# Patient Record
Sex: MALE | Race: WHITE | Employment: UNEMPLOYED | URBAN - NONMETROPOLITAN AREA
[De-identification: names, ages, dates, MRNs, and addresses within clinical notes are randomized per-mention and may not be internally consistent; named-entity substitution may affect disease eponyms.]

---

## 2018-10-30 ENCOUNTER — HOSPITAL ENCOUNTER (OUTPATIENT)
Age: 58
Discharge: HOME | End: 2018-10-30
Payer: MEDICAID

## 2018-10-30 ENCOUNTER — OFFICE VISIT (OUTPATIENT)
Dept: INTERNAL MEDICINE CLINIC | Age: 58
End: 2018-10-30
Payer: MEDICAID

## 2018-10-30 VITALS
WEIGHT: 312.4 LBS | HEART RATE: 84 BPM | SYSTOLIC BLOOD PRESSURE: 110 MMHG | OXYGEN SATURATION: 98 % | BODY MASS INDEX: 42.31 KG/M2 | DIASTOLIC BLOOD PRESSURE: 84 MMHG | HEIGHT: 72 IN

## 2018-10-30 DIAGNOSIS — Z86.018 S/P SELECTIVE TRANSSPHENOIDAL PITUITARY ADENOMECTOMY: ICD-10-CM

## 2018-10-30 DIAGNOSIS — D35.2 PROLACTINOMA (HCC): ICD-10-CM

## 2018-10-30 DIAGNOSIS — E66.01 OBESITY, MORBID, BMI 40.0-49.9 (HCC): ICD-10-CM

## 2018-10-30 DIAGNOSIS — E23.0 PANHYPOPITUITARISM (DIABETES INSIPIDUS/ANTERIOR PITUITARY DEFICIENCY) (HCC): ICD-10-CM

## 2018-10-30 DIAGNOSIS — Z98.890 S/P SELECTIVE TRANSSPHENOIDAL PITUITARY ADENOMECTOMY: ICD-10-CM

## 2018-10-30 DIAGNOSIS — E29.1 HYPOGONADISM MALE: ICD-10-CM

## 2018-10-30 DIAGNOSIS — E03.9 HYPOTHYROIDISM, UNSPECIFIED TYPE: ICD-10-CM

## 2018-10-30 DIAGNOSIS — E22.1 HYPERPROLACTINEMIA (HCC): Primary | ICD-10-CM

## 2018-10-30 LAB
CORTISOL: 6.12 UG/DL
FOLLICLE STIMULATING HORMONE: 0.4 MIU/ML (ref 0.9–11)
LUTEINIZING HORMONE: 0.1 MIU/ML (ref 0.6–6.3)
TSH SERPL DL<=0.05 MIU/L-ACNC: 3.33 UIU/ML (ref 0.4–4.2)

## 2018-10-30 PROCEDURE — 36415 COLL VENOUS BLD VENIPUNCTURE: CPT

## 2018-10-30 PROCEDURE — 1036F TOBACCO NON-USER: CPT | Performed by: INTERNAL MEDICINE

## 2018-10-30 PROCEDURE — 99204 OFFICE O/P NEW MOD 45 MIN: CPT | Performed by: INTERNAL MEDICINE

## 2018-10-30 PROCEDURE — G8417 CALC BMI ABV UP PARAM F/U: HCPCS | Performed by: INTERNAL MEDICINE

## 2018-10-30 PROCEDURE — 82533 TOTAL CORTISOL: CPT

## 2018-10-30 PROCEDURE — 3017F COLORECTAL CA SCREEN DOC REV: CPT | Performed by: INTERNAL MEDICINE

## 2018-10-30 PROCEDURE — 83001 ASSAY OF GONADOTROPIN (FSH): CPT

## 2018-10-30 PROCEDURE — G8427 DOCREV CUR MEDS BY ELIG CLIN: HCPCS | Performed by: INTERNAL MEDICINE

## 2018-10-30 PROCEDURE — 83002 ASSAY OF GONADOTROPIN (LH): CPT

## 2018-10-30 PROCEDURE — 84443 ASSAY THYROID STIM HORMONE: CPT

## 2018-10-30 PROCEDURE — G8484 FLU IMMUNIZE NO ADMIN: HCPCS | Performed by: INTERNAL MEDICINE

## 2018-10-30 PROCEDURE — 82024 ASSAY OF ACTH: CPT

## 2018-10-30 ASSESSMENT — PATIENT HEALTH QUESTIONNAIRE - PHQ9
SUM OF ALL RESPONSES TO PHQ QUESTIONS 1-9: 2
SUM OF ALL RESPONSES TO PHQ QUESTIONS 1-9: 2
1. LITTLE INTEREST OR PLEASURE IN DOING THINGS: 1
SUM OF ALL RESPONSES TO PHQ9 QUESTIONS 1 & 2: 2
2. FEELING DOWN, DEPRESSED OR HOPELESS: 1

## 2018-10-30 NOTE — PROGRESS NOTES
Sella Tursica     Standing Status:   Future     Standing Expiration Date:   10/30/2019     Order Specific Question:   Reason for exam:     Answer:   prolactinoma    MRI BRAIN W WO CONTRAST     ATTN Sella Tursica     Standing Status:   Future     Standing Expiration Date:   10/30/2019     Order Specific Question:   Reason for exam:     Answer:   prolactinoma    TSH     Standing Status:   Future     Number of Occurrences:   1     Standing Expiration Date:   10/30/2019    Cortisol     Standing Status:   Future     Number of Occurrences:   1     Standing Expiration Date:   10/30/2019     Order Specific Question:   8AM or 4PM?     Answer:   8am    ACTH     Standing Status:   Future     Number of Occurrences:   1     Standing Expiration Date:   38/33/5715    Follicle Stimulating Hormone     Standing Status:   Future     Number of Occurrences:   1     Standing Expiration Date:   10/30/2019    Luteinizing Hormone     Standing Status:   Future     Number of Occurrences:   1     Standing Expiration Date:   10/30/2019     He denies any double vision, headaches. He has not had an MRI of the brain in quite some time. Recent blood work IGF-I is 26, testosterone total less than 7. 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 I  think he needs testosterone replacement   cannot really rely on the TSH post pituitary surgery. I think he will also need thyroid replacement. I will see him back after MRI of the brain.   We'll also check ACTH, cortisol, FSH, LH

## 2018-10-31 LAB — ACTH: 29 PG/ML (ref 7–69)

## 2018-11-12 ENCOUNTER — HOSPITAL ENCOUNTER (OUTPATIENT)
Dept: MRI IMAGING | Age: 58
Discharge: HOME | End: 2018-11-12
Payer: MEDICAID

## 2018-11-12 DIAGNOSIS — D35.2 PROLACTINOMA (HCC): ICD-10-CM

## 2018-11-12 LAB — POC CREATININE WHOLE BLOOD: 1.3 MG/DL (ref 0.5–1.2)

## 2018-11-12 PROCEDURE — A9579 GAD-BASE MR CONTRAST NOS,1ML: HCPCS | Performed by: INTERNAL MEDICINE

## 2018-11-12 PROCEDURE — 70553 MRI BRAIN STEM W/O & W/DYE: CPT

## 2018-11-12 PROCEDURE — 6360000004 HC RX CONTRAST MEDICATION: Performed by: INTERNAL MEDICINE

## 2018-11-12 PROCEDURE — 82565 ASSAY OF CREATININE: CPT

## 2018-11-12 RX ADMIN — GADOTERIDOL 20 ML: 279.3 INJECTION, SOLUTION INTRAVENOUS at 08:57

## 2018-12-06 ENCOUNTER — TELEPHONE (OUTPATIENT)
Dept: INTERNAL MEDICINE CLINIC | Age: 58
End: 2018-12-06

## 2018-12-06 ENCOUNTER — OFFICE VISIT (OUTPATIENT)
Dept: INTERNAL MEDICINE CLINIC | Age: 58
End: 2018-12-06
Payer: MEDICAID

## 2018-12-06 VITALS
DIASTOLIC BLOOD PRESSURE: 84 MMHG | SYSTOLIC BLOOD PRESSURE: 136 MMHG | HEIGHT: 71 IN | HEART RATE: 84 BPM | WEIGHT: 315 LBS | BODY MASS INDEX: 44.1 KG/M2

## 2018-12-06 DIAGNOSIS — E66.01 OBESITY, MORBID, BMI 40.0-49.9 (HCC): ICD-10-CM

## 2018-12-06 DIAGNOSIS — E23.0 PANHYPOPITUITARISM (HCC): Primary | ICD-10-CM

## 2018-12-06 DIAGNOSIS — R07.9 CHEST PAIN, UNSPECIFIED TYPE: ICD-10-CM

## 2018-12-06 DIAGNOSIS — R79.89 LOW TESTOSTERONE: ICD-10-CM

## 2018-12-06 DIAGNOSIS — Z98.890 HISTORY OF PITUITARY SURGERY: ICD-10-CM

## 2018-12-06 PROCEDURE — G8427 DOCREV CUR MEDS BY ELIG CLIN: HCPCS | Performed by: INTERNAL MEDICINE

## 2018-12-06 PROCEDURE — 1036F TOBACCO NON-USER: CPT | Performed by: INTERNAL MEDICINE

## 2018-12-06 PROCEDURE — G8417 CALC BMI ABV UP PARAM F/U: HCPCS | Performed by: INTERNAL MEDICINE

## 2018-12-06 PROCEDURE — 99213 OFFICE O/P EST LOW 20 MIN: CPT | Performed by: INTERNAL MEDICINE

## 2018-12-06 PROCEDURE — 93000 ELECTROCARDIOGRAM COMPLETE: CPT | Performed by: INTERNAL MEDICINE

## 2018-12-06 PROCEDURE — 3017F COLORECTAL CA SCREEN DOC REV: CPT | Performed by: INTERNAL MEDICINE

## 2018-12-06 PROCEDURE — G8484 FLU IMMUNIZE NO ADMIN: HCPCS | Performed by: INTERNAL MEDICINE

## 2018-12-06 RX ORDER — TESTOSTERONE 12.5 MG/1.25G
1 GEL TOPICAL DAILY
Qty: 1 BOTTLE | Refills: 3 | Status: SHIPPED | OUTPATIENT
Start: 2018-12-06 | End: 2019-04-15

## 2019-03-05 ENCOUNTER — TELEPHONE (OUTPATIENT)
Dept: INTERNAL MEDICINE CLINIC | Age: 59
End: 2019-03-05

## 2019-04-15 ENCOUNTER — OFFICE VISIT (OUTPATIENT)
Dept: INTERNAL MEDICINE CLINIC | Age: 59
End: 2019-04-15
Payer: MEDICAID

## 2019-04-15 ENCOUNTER — NURSE ONLY (OUTPATIENT)
Dept: LAB | Age: 59
End: 2019-04-15

## 2019-04-15 VITALS
WEIGHT: 315 LBS | HEART RATE: 80 BPM | SYSTOLIC BLOOD PRESSURE: 108 MMHG | OXYGEN SATURATION: 98 % | HEIGHT: 71 IN | DIASTOLIC BLOOD PRESSURE: 70 MMHG | BODY MASS INDEX: 44.1 KG/M2

## 2019-04-15 DIAGNOSIS — Z98.890 HISTORY OF PITUITARY SURGERY: ICD-10-CM

## 2019-04-15 DIAGNOSIS — E29.1 HYPOGONADISM MALE: ICD-10-CM

## 2019-04-15 DIAGNOSIS — E66.01 OBESITY, MORBID, BMI 40.0-49.9 (HCC): ICD-10-CM

## 2019-04-15 DIAGNOSIS — Z86.018 S/P SELECTIVE TRANSSPHENOIDAL PITUITARY ADENOMECTOMY: ICD-10-CM

## 2019-04-15 DIAGNOSIS — D35.2 PROLACTINOMA (HCC): ICD-10-CM

## 2019-04-15 DIAGNOSIS — E23.0 PANHYPOPITUITARISM (HCC): ICD-10-CM

## 2019-04-15 DIAGNOSIS — Z98.890 S/P SELECTIVE TRANSSPHENOIDAL PITUITARY ADENOMECTOMY: ICD-10-CM

## 2019-04-15 DIAGNOSIS — E23.0 PANHYPOPITUITARISM (HCC): Primary | ICD-10-CM

## 2019-04-15 LAB
ANION GAP SERPL CALCULATED.3IONS-SCNC: 11 MEQ/L (ref 8–16)
BUN BLDV-MCNC: 17 MG/DL (ref 7–22)
CALCIUM SERPL-MCNC: 9 MG/DL (ref 8.5–10.5)
CHLORIDE BLD-SCNC: 101 MEQ/L (ref 98–111)
CO2: 26 MEQ/L (ref 23–33)
CORTISOL COLLECTION INFO: NORMAL
CORTISOL: 3.32 UG/DL
CREAT SERPL-MCNC: 1 MG/DL (ref 0.4–1.2)
FOLLICLE STIMULATING HORMONE: 0.4 MIU/ML (ref 0.9–11)
GFR SERPL CREATININE-BSD FRML MDRD: 76 ML/MIN/1.73M2
GLUCOSE BLD-MCNC: 85 MG/DL (ref 70–108)
LUTEINIZING HORMONE: 0.3 MIU/ML (ref 0.6–6.3)
OSMOLALITY URINE: 761 MOSMOL/KG (ref 250–750)
OSMOLALITY: 293 MOSMOL/KG (ref 275–295)
POTASSIUM SERPL-SCNC: 4 MEQ/L (ref 3.5–5.2)
PROLACTIN: 76.3 NG/ML
SODIUM BLD-SCNC: 138 MEQ/L (ref 135–145)
SODIUM URINE: 86 MEQ/L
T4 FREE: 0.42 NG/DL (ref 0.93–1.76)
TSH SERPL DL<=0.05 MIU/L-ACNC: 3.82 UIU/ML (ref 0.4–4.2)

## 2019-04-15 PROCEDURE — G8427 DOCREV CUR MEDS BY ELIG CLIN: HCPCS | Performed by: INTERNAL MEDICINE

## 2019-04-15 PROCEDURE — 99213 OFFICE O/P EST LOW 20 MIN: CPT | Performed by: INTERNAL MEDICINE

## 2019-04-15 PROCEDURE — G8417 CALC BMI ABV UP PARAM F/U: HCPCS | Performed by: INTERNAL MEDICINE

## 2019-04-15 PROCEDURE — 3017F COLORECTAL CA SCREEN DOC REV: CPT | Performed by: INTERNAL MEDICINE

## 2019-04-15 PROCEDURE — 1036F TOBACCO NON-USER: CPT | Performed by: INTERNAL MEDICINE

## 2019-04-15 ASSESSMENT — PATIENT HEALTH QUESTIONNAIRE - PHQ9
SUM OF ALL RESPONSES TO PHQ9 QUESTIONS 1 & 2: 1
SUM OF ALL RESPONSES TO PHQ QUESTIONS 1-9: 1
SUM OF ALL RESPONSES TO PHQ QUESTIONS 1-9: 1
2. FEELING DOWN, DEPRESSED OR HOPELESS: 0
1. LITTLE INTEREST OR PLEASURE IN DOING THINGS: 1

## 2019-04-15 NOTE — PROGRESS NOTES
[facility]  PHYSICIANS [place] [address] [address], [address]ales [facility]  Dept: [phone]  Dept Fax: [phone]      Chief Complaint   Patient presents with    Other     Low Testosterone       Patient presents for evaluation of hyperprolactinemia. I saw him 4 months ago  This patient is followed regularly by Joseph Estrada, CNP    He has a history of 2 pituitary surgeries. The first was in 1978 with a skull flap done. Recurrent surgery in 1987, via transsphenoidal approach. The patient had recurrent headaches, back in 87. He had both surgeries at [facility]. He was referred here now for elevated prolactin levels   He says he is not on any thyroid hormone or cortisone  He was on bromocriptine since he was first diagnosed. At that time he had galactorrhea as well   He no longer takes it. I ordered labs and a MRI of the brain  MRI of the brain showed postop changes but no mass. He says he has new to new problems. He says he will follow sleep work despite using a CPAP. He says he wakes up feeling refreshed, but then falls asleep at work. He's also been having urinary incontinence over the past 10 days. He willt have to go and  not be able to hold it  Past Medical History:   Diagnosis Date    Elevated prolactin level (HCC)     [facility]uitary aden[facility] ([facility]     Sleep apnea        Current Outpatient Medications   Medication Sig Dispense Refill    sertraline (ZOLOFT) 50 MG tablet Take 50 mg by mouth daily       No current facility-administered medications for this visit. Review of Systems - as above. Following asleep at work. Urinary incontinence. No headache    Blood pressure 108/70, pulse 80, height 5' 11.5\" (1.816 m), weight (!) 340 lb (154.2 kg), SpO2 98 %. Physical Examination: Alert and oriented. Neck is supple, no JVD or bruits. Mental status is normal.  Hearing is normal     Diagnosis Orders   1.  Panhypopituitarism ([facility]  Basic Metabolic Panel    Sodium, Urine, Random    Osmolality, Urine    Osmolality, Serum    TSH    T4, Free    Cortisol    ACTH    Testosterone, free, total    Follicle Stimulating Hormone    Luteinizing Hormone    Growth Hormone    Insulin-Like Growth Factor   2. Obesity, morbid, BMI 40.0-49.9 ([facility]     3. History of pituitary surgery     4. [facility] ([facility]     5. S/P selective transsphenoidal pituitary adenomectomy     6.  Hypogonadism male         Orders Placed This Encounter   Procedures    Basic Metabolic Panel     Standing Status:   Future     Number of Occurrences:   1     Standing Expiration Date:   4/15/2020    Sodium, Urine, Random     Standing Status:   Future     Number of Occurrences:   1     Standing Expiration Date:   4/15/2020    Osmolality, Urine     Standing Status:   Future     Number of Occurrences:   1     Standing Expiration Date:   4/15/2020    Osmolality, Serum     Standing Status:   Future     Number of Occurrences:   1     Standing Expiration Date:   4/15/2020    TSH     Standing Status:   Future     Number of Occurrences:   1     Standing Expiration Date:   4/15/2020    T4, Free     Standing Status:   Future     Number of Occurrences:   1     Standing Expiration Date:   4/15/2020    Cortisol     Standing Status:   Future     Number of Occurrences:   1     Standing Expiration Date:   4/15/2020     Order Specific Question:   8AM or 4PM?     Answer:   8am    ACTH     Standing Status:   Future     Number of Occurrences:   1     Standing Expiration Date:   4/15/2020    Testosterone, free, total     Standing Status:   Future     Number of Occurrences:   1     Standing Expiration Date:   5/46/2363    Follicle Stimulating Hormone     Standing Status:   Future     Number of Occurrences:   1     Standing Expiration Date:   4/15/2020    Luteinizing Hormone     Standing Status:   Future     Number of Occurrences:   1     Standing Expiration Date:   4/15/2020    Growth Hormone     Standing Status:   Future     Number of

## 2019-04-17 ENCOUNTER — TELEPHONE (OUTPATIENT)
Dept: INTERNAL MEDICINE CLINIC | Age: 59
End: 2019-04-17

## 2019-04-17 DIAGNOSIS — E23.0 PANHYPOPITUITARISM (HCC): Primary | ICD-10-CM

## 2019-04-17 DIAGNOSIS — R79.89 LOW TESTOSTERONE: ICD-10-CM

## 2019-04-17 LAB
ACTH: 23 PG/ML (ref 7–69)
GROWTH HORMONE: < 0.05 NG/ML (ref 0.05–3)
SOMATOMEDIN IGF 1 Z-SCORE: -2.5
SOMATOMEDIN: 54 NG/ML (ref 53–206)
TESTOSTERONE FREE: NORMAL

## 2019-04-18 RX ORDER — HYDROCORTISONE 10 MG/1
10 TABLET ORAL 2 TIMES DAILY
Qty: 60 TABLET | Refills: 3 | Status: SHIPPED | OUTPATIENT
Start: 2019-04-18 | End: 2021-07-20 | Stop reason: SDUPTHER

## 2019-04-18 RX ORDER — BROMOCRIPTINE MESYLATE 2.5 MG/1
2.5 TABLET ORAL DAILY
Qty: 30 TABLET | Refills: 3 | Status: SHIPPED | OUTPATIENT
Start: 2019-04-18 | End: 2020-06-04 | Stop reason: SDUPTHER

## 2019-04-18 NOTE — TELEPHONE ENCOUNTER
Patient notified and agreed with plan. Rx sent to [facility] and patient will be seen again on 4/29/19. Also Dr Gil Riggins gave patient a letter stating why he is falling asleep and work due to excessive sleepiness.

## 2019-04-29 ENCOUNTER — OFFICE VISIT (OUTPATIENT)
Dept: INTERNAL MEDICINE CLINIC | Age: 59
End: 2019-04-29
Payer: MEDICAID

## 2019-04-29 VITALS
SYSTOLIC BLOOD PRESSURE: 126 MMHG | DIASTOLIC BLOOD PRESSURE: 80 MMHG | WEIGHT: 315 LBS | HEIGHT: 71 IN | BODY MASS INDEX: 44.1 KG/M2 | HEART RATE: 64 BPM | OXYGEN SATURATION: 98 %

## 2019-04-29 DIAGNOSIS — E66.01 OBESITY, MORBID, BMI 40.0-49.9 (HCC): ICD-10-CM

## 2019-04-29 DIAGNOSIS — R32 URINARY INCONTINENCE, UNSPECIFIED TYPE: ICD-10-CM

## 2019-04-29 DIAGNOSIS — E23.0 PANHYPOPITUITARISM (HCC): Primary | ICD-10-CM

## 2019-04-29 DIAGNOSIS — Z98.890 HISTORY OF PITUITARY SURGERY: ICD-10-CM

## 2019-04-29 PROCEDURE — 99213 OFFICE O/P EST LOW 20 MIN: CPT | Performed by: INTERNAL MEDICINE

## 2019-04-29 PROCEDURE — 3017F COLORECTAL CA SCREEN DOC REV: CPT | Performed by: INTERNAL MEDICINE

## 2019-04-29 PROCEDURE — G8427 DOCREV CUR MEDS BY ELIG CLIN: HCPCS | Performed by: INTERNAL MEDICINE

## 2019-04-29 PROCEDURE — 1036F TOBACCO NON-USER: CPT | Performed by: INTERNAL MEDICINE

## 2019-04-29 PROCEDURE — G8417 CALC BMI ABV UP PARAM F/U: HCPCS | Performed by: INTERNAL MEDICINE

## 2019-04-29 RX ORDER — CATHETER
1 EACH MISCELLANEOUS DAILY
Qty: 30 EACH | Refills: 1 | Status: SHIPPED | OUTPATIENT
Start: 2019-04-29 | End: 2020-06-04

## 2019-04-29 RX ORDER — LEVOTHYROXINE SODIUM 0.12 MG/1
125 TABLET ORAL DAILY
Qty: 30 TABLET | Refills: 5 | Status: SHIPPED | OUTPATIENT
Start: 2019-04-29 | End: 2020-06-04 | Stop reason: SDUPTHER

## 2019-04-29 NOTE — PROGRESS NOTES
work.  Urinary incontinence. No headache    Blood pressure 126/80, pulse 64, height 5' 11.5\" (1.816 m), weight (!) 338 lb (153.3 kg), SpO2 98 %. Physical Examination: Alert and oriented. Neck is supple, no JVD or bruits. Mental status is normal.  Hearing is normal     Diagnosis Orders   1. Panhypopituitarism (HCC)  levothyroxine (SYNTHROID) 125 MCG tablet    TSH With Reflex Ft4   2. Obesity, morbid, BMI 40.0-49.9 ([address]     3. History of pituitary surgery     4. Urinary incontinence, unspecified type         Orders Placed This Encounter   Procedures    TSH With Reflex Ft4     Standing Status:   Future     Standing Expiration Date:   4/29/2020     Thyroid hormones are low. He is deficient in cortisol, testosterone is low. His urine osmolality and serum osmolality do not demonstrate SIADH. His serum sodium is normal.  I repleted his steroids 2 weeks ago Solu-Cortef 10 mg twice a day  I am going to start thyroid now. Eventually, I will start testosterone. I did give him a [place] catheter.   Hopefully, with repletion of hormones his symptoms improve

## 2019-06-10 ENCOUNTER — NURSE ONLY (OUTPATIENT)
Dept: LAB | Age: 59
End: 2019-06-10

## 2019-06-10 DIAGNOSIS — E23.0 PANHYPOPITUITARISM (HCC): ICD-10-CM

## 2019-06-10 LAB
T4 FREE: 1.26 NG/DL (ref 0.93–1.76)
TSH SERPL DL<=0.05 MIU/L-ACNC: 0.07 UIU/ML (ref 0.4–4.2)

## 2019-07-30 ENCOUNTER — HOSPITAL ENCOUNTER (EMERGENCY)
Dept: GENERAL RADIOLOGY | Age: 59
Discharge: HOME | End: 2019-07-30
Payer: MEDICAID

## 2019-07-30 ENCOUNTER — HOSPITAL ENCOUNTER (EMERGENCY)
Age: 59
Discharge: HOME | End: 2019-07-30
Payer: MEDICAID

## 2019-07-30 VITALS
WEIGHT: 315 LBS | TEMPERATURE: 98 F | HEART RATE: 97 BPM | BODY MASS INDEX: 42.66 KG/M2 | OXYGEN SATURATION: 98 % | HEIGHT: 72 IN | SYSTOLIC BLOOD PRESSURE: 158 MMHG | DIASTOLIC BLOOD PRESSURE: 99 MMHG | RESPIRATION RATE: 20 BRPM

## 2019-07-30 DIAGNOSIS — S83.411A SPRAIN OF MEDIAL COLLATERAL LIGAMENT OF RIGHT KNEE, INITIAL ENCOUNTER: Primary | ICD-10-CM

## 2019-07-30 PROCEDURE — 99213 OFFICE O/P EST LOW 20 MIN: CPT | Performed by: NURSE PRACTITIONER

## 2019-07-30 PROCEDURE — 73564 X-RAY EXAM KNEE 4 OR MORE: CPT

## 2019-07-30 PROCEDURE — 99213 OFFICE O/P EST LOW 20 MIN: CPT

## 2019-07-30 PROCEDURE — 2709999900 HC NON-CHARGEABLE SUPPLY

## 2019-07-30 ASSESSMENT — ENCOUNTER SYMPTOMS
DIARRHEA: 0
NAUSEA: 0
SHORTNESS OF BREATH: 0
BACK PAIN: 0
RECTAL PAIN: 0
VOMITING: 0
COLOR CHANGE: 0

## 2019-07-30 NOTE — ED PROVIDER NOTES
Supplies (CATHETER EXTENSION TUBING) MISC DAILY Starting Mon 4/29/2019, Disp-1 mL, R-1, Print      hydrocortisone (CORTEF) 10 MG tablet Take 1 tablet by mouth 2 times daily, Disp-60 tablet, R-3Normal      bromocriptine (PARLODEL) 2.5 MG tablet Take 1 tablet by mouth daily, Disp-30 tablet, R-3Normal      sertraline (ZOLOFT) 50 MG tablet Take 50 mg by mouth dailyHistorical Med             ALLERGIES     Patient is has No Known Allergies. Patients   Immunization History   Administered Date(s) Administered    Influenza Virus Vaccine 10/11/2018       FAMILY HISTORY     Patient's family history includes Heart Attack in his father. SOCIAL HISTORY     Patient  reports that he has never smoked. He has never used smokeless tobacco. He reports that he drinks alcohol. He reports that he does not use drugs. PHYSICAL EXAM     ED TRIAGE VITALS  BP: (!) 158/99, Temp: 98 °F (36.7 °C), Pulse: 97, Resp: 20, SpO2: 98 %,Estimated body mass index is 45.43 kg/m² as calculated from the following:    Height as of this encounter: 6' (1.829 m). Weight as of this encounter: 335 lb (152 kg). ,No LMP for male patient. Physical Exam   Constitutional: He is oriented to person, place, and time. Vital signs are normal. He appears well-developed and well-nourished. He is active and cooperative. Non-toxic appearance. He does not have a sickly appearance. He does not appear ill. No distress. HENT:   Head: Normocephalic and atraumatic. Eyes: Conjunctivae, EOM and lids are normal.   Neck: Trachea normal and normal range of motion. Neck supple. Cardiovascular: Normal rate. Pulmonary/Chest: Effort normal.   Abdominal: Normal appearance. There is no CVA tenderness. Musculoskeletal:        Right knee: He exhibits decreased range of motion, swelling and ecchymosis.  He exhibits no effusion, no deformity, no laceration, no erythema, normal alignment, no LCL laxity, normal patellar mobility, no bony tenderness, normal meniscus and no MCL laxity. Tenderness found. Medial joint line tenderness noted. Left foot: There is tenderness and bony tenderness (Fourth and fifth toes). There is normal range of motion, no swelling, normal capillary refill, no crepitus, no deformity and no laceration. Neurological: He is alert and oriented to person, place, and time. Skin: Skin is warm and dry. No rash noted. Nursing note and vitals reviewed. DIAGNOSTIC RESULTS     Labs:No results found for this visit on 07/30/19. IMAGING:    XR KNEE RIGHT (MIN 4 VIEWS)   Final Result    No acute fracture or dislocation. Probable small suprapatellar joint effusion. **This report has been created using voice recognition software. It may contain minor errors which are inherent in voice recognition technology. **      Final report electronically signed by Dr. Sheriff Daily on 7/30/2019 3:04 PM            EKG:      URGENT CARE COURSE:     Vitals:    07/30/19 1423   BP: (!) 158/99   Pulse: 97   Resp: 20   Temp: 98 °F (36.7 °C)   TempSrc: Tympanic   SpO2: 98%   Weight: (!) 335 lb (152 kg)   Height: 6' (1.829 m)       Medications - No data to display         PROCEDURES:  None    FINAL IMPRESSION      1. Sprain of medial collateral ligament of right knee, initial encounter          DISPOSITION/ PLAN     The patient's physical exam and x-ray studies are consistent with sprain of the medial collateral ligament. He was given an Ace wrap to help provide support. He is to use Tylenol or Motrin for pain. The patient is to take the medication as prescribed/directed. The patient is to follow-up with their PCP in 2-3 days. They are to go to the ER for any worsening symptoms. The patient/caregiver were agreeable to this plan of care and voiced no concerns at time of discharge. The patient was ambulatory out of the department in stable condition.        PATIENT REFERRED TO:  JACKLYN Mercado - KISHOR Montiel [address] / [place] [address]      DISCHARGE

## 2020-06-04 ENCOUNTER — OFFICE VISIT (OUTPATIENT)
Dept: INTERNAL MEDICINE CLINIC | Age: 60
End: 2020-06-04
Payer: MEDICAID

## 2020-06-04 VITALS
SYSTOLIC BLOOD PRESSURE: 128 MMHG | HEIGHT: 72 IN | TEMPERATURE: 97.8 F | HEART RATE: 83 BPM | DIASTOLIC BLOOD PRESSURE: 62 MMHG | BODY MASS INDEX: 42.66 KG/M2 | RESPIRATION RATE: 16 BRPM | WEIGHT: 315 LBS

## 2020-06-04 PROCEDURE — G8417 CALC BMI ABV UP PARAM F/U: HCPCS | Performed by: INTERNAL MEDICINE

## 2020-06-04 PROCEDURE — 1036F TOBACCO NON-USER: CPT | Performed by: INTERNAL MEDICINE

## 2020-06-04 PROCEDURE — 99213 OFFICE O/P EST LOW 20 MIN: CPT | Performed by: INTERNAL MEDICINE

## 2020-06-04 PROCEDURE — 3017F COLORECTAL CA SCREEN DOC REV: CPT | Performed by: INTERNAL MEDICINE

## 2020-06-04 PROCEDURE — G8427 DOCREV CUR MEDS BY ELIG CLIN: HCPCS | Performed by: INTERNAL MEDICINE

## 2020-06-04 RX ORDER — BROMOCRIPTINE MESYLATE 2.5 MG/1
2.5 TABLET ORAL DAILY
Qty: 30 TABLET | Refills: 3 | Status: SHIPPED | OUTPATIENT
Start: 2020-06-04 | End: 2021-07-20 | Stop reason: SDUPTHER

## 2020-06-04 RX ORDER — LEVOTHYROXINE SODIUM 0.12 MG/1
125 TABLET ORAL DAILY
Qty: 30 TABLET | Refills: 5 | Status: SHIPPED | OUTPATIENT
Start: 2020-06-04 | End: 2021-07-20 | Stop reason: SDUPTHER

## 2020-06-05 LAB
CORTISOL - AM: 6 MCG/DL (ref 6.7–22.6)
PROLACTIN: 54.36 NG/ML (ref 2.64–13.13)

## 2020-06-09 LAB
TESTOSTERONE FREE: ABNORMAL NG/DL (ref 3.67–13.9)
TESTOSTERONE TOTAL: <7 NG/DL (ref 240–950)

## 2020-06-26 ENCOUNTER — OFFICE VISIT (OUTPATIENT)
Dept: INTERNAL MEDICINE CLINIC | Age: 60
End: 2020-06-26
Payer: MEDICAID

## 2020-06-26 VITALS
HEART RATE: 72 BPM | HEIGHT: 72 IN | DIASTOLIC BLOOD PRESSURE: 80 MMHG | BODY MASS INDEX: 42.66 KG/M2 | SYSTOLIC BLOOD PRESSURE: 130 MMHG | WEIGHT: 315 LBS

## 2020-06-26 PROCEDURE — 1036F TOBACCO NON-USER: CPT | Performed by: NURSE PRACTITIONER

## 2020-06-26 PROCEDURE — 99214 OFFICE O/P EST MOD 30 MIN: CPT | Performed by: NURSE PRACTITIONER

## 2020-06-26 PROCEDURE — G8427 DOCREV CUR MEDS BY ELIG CLIN: HCPCS | Performed by: NURSE PRACTITIONER

## 2020-06-26 PROCEDURE — 3017F COLORECTAL CA SCREEN DOC REV: CPT | Performed by: NURSE PRACTITIONER

## 2020-06-26 PROCEDURE — G8417 CALC BMI ABV UP PARAM F/U: HCPCS | Performed by: NURSE PRACTITIONER

## 2020-06-26 ASSESSMENT — PATIENT HEALTH QUESTIONNAIRE - PHQ9
SUM OF ALL RESPONSES TO PHQ QUESTIONS 1-9: 1
2. FEELING DOWN, DEPRESSED OR HOPELESS: 1
SUM OF ALL RESPONSES TO PHQ QUESTIONS 1-9: 1
1. LITTLE INTEREST OR PLEASURE IN DOING THINGS: 0
SUM OF ALL RESPONSES TO PHQ9 QUESTIONS 1 & 2: 1

## 2020-06-26 NOTE — PROGRESS NOTES
everything      Medications    Current Outpatient Medications:     levothyroxine (SYNTHROID) 125 MCG tablet, Take 1 tablet by mouth daily, Disp: 30 tablet, Rfl: 5    bromocriptine (PARLODEL) 2.5 MG tablet, Take 1 tablet by mouth daily, Disp: 30 tablet, Rfl: 3    hydrocortisone (CORTEF) 10 MG tablet, Take 1 tablet by mouth 2 times daily, Disp: 60 tablet, Rfl: 3    sertraline (ZOLOFT) 50 MG tablet, Take 50 mg by mouth daily, Disp: , Rfl:     The patient has No Known Allergies. Past Medical History  Ramon Robert  has a past medical history of Elevated prolactin level ([facility] Pituitary adenoma ([facility] [address] and Sleep apnea. Past Surgical History  The patient  has a past surgical history that includes Skin cancer excision and brain surgery (1980, 1987). Family History  This patient's family history includes Heart Attack in his father. Social History  Ramon Robert  reports that he has never smoked. He has never used smokeless tobacco. He reports current alcohol use. He reports that he does not use drugs. Health Maintenance:    Health Maintenance   Topic Date Due    Hepatitis C screen  1960    HIV screen  07/18/1973    DTaP/Tdap/Td vaccine (1 - Tdap) 07/18/1977    Lipid screen  07/18/1998    Shingles Vaccine (1 of 2) 07/18/2008    Colon cancer screen colonoscopy  07/18/2008    Flu vaccine (1) 09/01/2020    Hepatitis A vaccine  Aged Out    Hepatitis B vaccine  Aged Out    Hib vaccine  Aged Out    Meningococcal (ACWY) vaccine  Aged Out    Pneumococcal 0-64 years Vaccine  Aged Out       Subjective:      Review of Systems   Constitutional: Positive for fatigue. Negative for chills and fever. HENT: Negative for sore throat and trouble swallowing. Eyes: Negative for itching and visual disturbance. Respiratory: Negative for cough, choking and shortness of breath. Cardiovascular: Negative for chest pain and leg swelling.    Gastrointestinal: Negative for abdominal pain, constipation, diarrhea, nausea and vomiting. Endocrine: Negative for cold intolerance and heat intolerance. Genitourinary: Negative for difficulty urinating and dysuria. Musculoskeletal: Negative for arthralgias and myalgias. Skin: Negative for rash and wound. Neurological: Negative for dizziness, tremors, weakness, light-headedness, numbness and headaches. Psychiatric/Behavioral: Negative for agitation, dysphoric mood, sleep disturbance and suicidal ideas. The patient is not nervous/anxious. Objective:     /80 (Site: Left Upper Arm, Cuff Size: Large Adult)   Pulse 72   Ht 6' 0.01\" (1.829 m)   Wt (!) 341 lb 12.8 oz (155 kg)   BMI 46.35 kg/m²     Physical Exam  Vitals signs reviewed. Constitutional:       General: He is not in acute distress. Appearance: He is well-developed. He is obese. He is not diaphoretic. HENT:      Head: Normocephalic and atraumatic. Comments: Question mild exopthalmos right eye     Mouth/Throat:      Pharynx: No oropharyngeal exudate. Eyes:      General: No scleral icterus. Right eye: No discharge. Left eye: No discharge. Pupils: Pupils are equal, round, and reactive to light. Neck:      Musculoskeletal: Normal range of motion and neck supple. Thyroid: No thyromegaly. Cardiovascular:      Rate and Rhythm: Normal rate and regular rhythm. Heart sounds: Normal heart sounds. No murmur. No friction rub. No gallop. Pulmonary:      Effort: Pulmonary effort is normal. No respiratory distress. Breath sounds: Normal breath sounds. No wheezing or rales. Abdominal:      General: There is no distension. Palpations: Abdomen is soft. Tenderness: There is no abdominal tenderness. Musculoskeletal: Normal range of motion. General: No tenderness or deformity. Lymphadenopathy:      Cervical: No cervical adenopathy. Skin:     General: Skin is warm and dry. Coloration: Skin is not pale. Findings: No erythema or rash. Neurological:      Mental Status: He is alert and oriented to person, place, and time. Cranial Nerves: No cranial nerve deficit. Labs Reviewed 6/26/2020:    Lab Results   Component Value Date     04/15/2019    K 4.0 04/15/2019     04/15/2019    CREATININE 1.0 04/15/2019    BUN 17 04/15/2019    CO2 26 04/15/2019    TSH 0.068 (L) 06/10/2019     Results for Gretchen Arroyo (MRN [id number]) as of 7/14/2020 13:15   Ref. Range 4/15/2019 11:19 6/10/2019 14:58   Sodium Latest Ref Range: 135 - 145 meq/L 138    Potassium Latest Ref Range: 3.5 - 5.2 meq/L 4.0    Chloride Latest Ref Range: 98 - 111 meq/L 101    CO2 Latest Ref Range: 23 - 33 meq/L 26    BUN Latest Ref Range: 7 - 22 mg/dL 17    Creatinine Latest Ref Range: 0.4 - 1.2 mg/dL 1.0    Anion Gap Latest Ref Range: 8.0 - 16.0 meq/L 11.0    Est, Glom Filt Rate Latest Units: ml/min/1.73m2 76 (A)    Glucose Latest Ref Range: 70 - 108 mg/dL 85    Calcium Latest Ref Range: 8.5 - 10.5 mg/dL 9.0    Osmolality Latest Ref Range: 275 - 295 mosmol/kg 293    Cortisol Latest Units: ug/dL 3.32    Cortisol Collection Info Unknown AM Specimen    ACTH Latest Ref Range: 7 - 69 pg/mL 23    FSH Latest Ref Range: 0.9 - 11.0 mIU/ml 0.4 (L)    Growth Hormone Latest Ref Range: 0.05 - 3.00 ng/mL < 0.05 (L)    LH Latest Ref Range: 0.6 - 6.3 mIU/ml 0.3 (L)    Prolactin Latest Units: ng/ml 76.3    Testosterone, Free Unknown SEE BELOW    TSH Latest Ref Range: 0.400 - 4.20 uIU/mL 3.820 0.068 (L)   T4 Free Latest Ref Range: 0.93 - 1.76 ng/dL 0.42 (L) 1.26   Osmolality, Ur Latest Ref Range: 250 - 750 mosmol/kg 761 (H)    Sodium, Ur Latest Units: meq/l 86    SOMATOMEDIN Latest Ref Range: 53 - 206 ng/mL 54    Somatomedin IFG 1 Z-Score Calculation Unknown -2.5        Results for Gretchen Arroyo (MRN [id number]) as of 7/12/2020 23:57   Ref.  Range 6/5/2020 08:54   Cortisol - AM Latest Ref Range: 6.7 - 22.6 mcg/dL 6.0 (L)   Prolactin Latest Ref Range: 2.64 - 13.13 ng/mL 54.36 (H) Testosterone Latest Ref Range: 240 - 950 ng/dL <7.0 (L)   Testosterone, Free Latest Ref Range: 3.67 - 13.9 ng/dL See Comments     6/9/2020  5:08 PM - Andriy, Lab [facility]     Component  Value  Ref Range & Units  Status  Collected  Lab    Testosterone, Free  See Comments  3.67 - 13.9 ng/dL  Final  06/05/2020  8:54 AM  [facility]    Free Testosterone concentrations are calculated from total     testosterone after measuring the percentage of free     testosterone.  Since the total testosterone in this patient     was below the limit of quantification (<7 ng/dL), the free     testosterone concentration could NOT be calculated.     -------------------ADDITIONAL INFORMATION-------------------     Testing performed by [facility].     This test was developed and its performance characteristics     determined by [facility] in a manner consistent with CLIA     requirements. This test has not been cleared or approved by     the U.S. Food and Drug Administration.      Testosterone  <7.0Low    240 - 950 ng/dL  Final  06/05/2020  8:54 AM  [facility]    -------------------ADDITIONAL INFORMATION-------------------     Testing performed by Liquid Chromatography-Tandem Mass     Spectrometry (LC-MS/MS).     This test was developed and its performance characteristics     determined by [facility] in a manner consistent with CLIA     requirements. This test has not been cleared or approved by     the U.S. Food and Drug Administration.     Test Performed by: [address]     : Mercedze Booker M.D. Ph.D.; CLIA# [id number]        Assessment/Plan      1. Panhypopituitarism ([address]  AM cortisol low, but pt has been forgetting his pm dose of Cortisol a lot of days. Prolactin level high but improving. Discussed setting alarms for his medications to keep him on track. 2. Low testosterone  Free testosterone <7.  Will discuss with Dr. Karen Velez for reccs. 3. Exophthalmos of right eye  Check thyroid function, last TSH 0.068, fT4 1.26. On Synthroid 125 mcg daily.   - TSH With Reflex Ft4; Future  - T3, Free; Future      Return in about 6 months (around 12/26/2020) for Dr. Karen Velez. Patient given educational materials - see patient instructions. Discussed use, benefit, and side effects of prescribed medications. All patient questions answered. Pt voiced understanding. Reviewed health maintenance.        Electronically signed JACKLYN Clemons CNP on 6/26/20 at 9:18 AM EDT

## 2020-07-13 ASSESSMENT — ENCOUNTER SYMPTOMS
ABDOMINAL PAIN: 0
SORE THROAT: 0
VOMITING: 0
EYE ITCHING: 0
NAUSEA: 0
DIARRHEA: 0
SHORTNESS OF BREATH: 0
CHOKING: 0
COUGH: 0
TROUBLE SWALLOWING: 0
CONSTIPATION: 0

## 2020-07-14 ENCOUNTER — TELEPHONE (OUTPATIENT)
Dept: INTERNAL MEDICINE CLINIC | Age: 60
End: 2020-07-14

## 2020-07-14 NOTE — TELEPHONE ENCOUNTER
Discussed his case with Dr. Zenaida Flores. Needs testosterone replacement due to his low free testosterone of <7. AndroGel 1% gel 50 mg qam    Low cortisol - Needs to be reminded to be sure he is taking his cortef twice daily as instructed. Remind him to get his TSH drawn so I can adjust his Synthroid now. Reheck testosterone, am cortisol, prolactin, and TSH with reflex levels in 3 months after above changes.

## 2020-07-16 RX ORDER — TESTOSTERONE GEL, 1% 10 MG/G
50 GEL TRANSDERMAL DAILY
Qty: 1 TUBE | Refills: 5 | Status: SHIPPED | OUTPATIENT
Start: 2020-07-16 | End: 2021-07-20 | Stop reason: SDUPTHER

## 2020-07-24 ENCOUNTER — TELEPHONE (OUTPATIENT)
Dept: INTERNAL MEDICINE CLINIC | Age: 60
End: 2020-07-24

## 2020-07-24 NOTE — TELEPHONE ENCOUNTER
Received denial from [facility] for testosterone gel. States they do not have testosterone labs.  Need to send his labs to them for appeal.

## 2020-12-22 ENCOUNTER — OFFICE VISIT (OUTPATIENT)
Dept: INTERNAL MEDICINE CLINIC | Age: 60
End: 2020-12-22

## 2020-12-22 VITALS
RESPIRATION RATE: 12 BRPM | SYSTOLIC BLOOD PRESSURE: 128 MMHG | HEART RATE: 87 BPM | DIASTOLIC BLOOD PRESSURE: 82 MMHG | BODY MASS INDEX: 41.75 KG/M2 | OXYGEN SATURATION: 97 % | WEIGHT: 315 LBS | HEIGHT: 73 IN | TEMPERATURE: 97.7 F

## 2020-12-22 PROCEDURE — 99024 POSTOP FOLLOW-UP VISIT: CPT | Performed by: INTERNAL MEDICINE

## 2020-12-22 RX ORDER — LEVOTHYROXINE SODIUM 0.12 MG/1
125 TABLET ORAL DAILY
Qty: 30 TABLET | Refills: 5 | Status: CANCELLED | OUTPATIENT
Start: 2020-12-22

## 2020-12-22 RX ORDER — TESTOSTERONE GEL, 1% 10 MG/G
50 GEL TRANSDERMAL DAILY
Qty: 1 TUBE | Refills: 5 | Status: CANCELLED | OUTPATIENT
Start: 2020-12-22 | End: 2021-01-21

## 2020-12-22 RX ORDER — HYDROCORTISONE 10 MG/1
10 TABLET ORAL 2 TIMES DAILY
Qty: 60 TABLET | Refills: 3 | Status: CANCELLED | OUTPATIENT
Start: 2020-12-22

## 2020-12-22 RX ORDER — BROMOCRIPTINE MESYLATE 2.5 MG/1
2.5 TABLET ORAL DAILY
Qty: 30 TABLET | Refills: 3 | Status: CANCELLED | OUTPATIENT
Start: 2020-12-22

## 2021-07-20 ENCOUNTER — HOSPITAL ENCOUNTER (OUTPATIENT)
Age: 61
Discharge: HOME | End: 2021-07-20
Payer: MEDICAID

## 2021-07-20 ENCOUNTER — OFFICE VISIT (OUTPATIENT)
Dept: INTERNAL MEDICINE CLINIC | Age: 61
End: 2021-07-20
Payer: MEDICAID

## 2021-07-20 VITALS
HEIGHT: 73 IN | DIASTOLIC BLOOD PRESSURE: 82 MMHG | WEIGHT: 315 LBS | SYSTOLIC BLOOD PRESSURE: 107 MMHG | BODY MASS INDEX: 41.75 KG/M2 | HEART RATE: 83 BPM | TEMPERATURE: 97.8 F

## 2021-07-20 DIAGNOSIS — E23.0 PANHYPOPITUITARISM (HCC): Primary | ICD-10-CM

## 2021-07-20 DIAGNOSIS — R79.89 LOW TESTOSTERONE: ICD-10-CM

## 2021-07-20 DIAGNOSIS — Z98.890 HISTORY OF PITUITARY SURGERY: ICD-10-CM

## 2021-07-20 DIAGNOSIS — D35.2 PROLACTINOMA (HCC): ICD-10-CM

## 2021-07-20 DIAGNOSIS — E23.0 PANHYPOPITUITARISM (HCC): ICD-10-CM

## 2021-07-20 LAB
ANION GAP SERPL CALCULATED.3IONS-SCNC: 10 MEQ/L (ref 8–16)
BUN BLDV-MCNC: 9 MG/DL (ref 7–22)
CALCIUM SERPL-MCNC: 9.5 MG/DL (ref 8.5–10.5)
CHLORIDE BLD-SCNC: 104 MEQ/L (ref 98–111)
CO2: 22 MEQ/L (ref 23–33)
CORTISOL COLLECTION INFO: NORMAL
CORTISOL: 5.14 UG/DL
CREAT SERPL-MCNC: 1.1 MG/DL (ref 0.4–1.2)
GFR SERPL CREATININE-BSD FRML MDRD: 68 ML/MIN/1.73M2
GLUCOSE BLD-MCNC: 111 MG/DL (ref 70–108)
POTASSIUM SERPL-SCNC: 3.9 MEQ/L (ref 3.5–5.2)
PROLACTIN: 87.4 NG/ML
SODIUM BLD-SCNC: 136 MEQ/L (ref 135–145)
TSH SERPL DL<=0.05 MIU/L-ACNC: 2.89 UIU/ML (ref 0.4–4.2)

## 2021-07-20 PROCEDURE — 84146 ASSAY OF PROLACTIN: CPT

## 2021-07-20 PROCEDURE — 3017F COLORECTAL CA SCREEN DOC REV: CPT | Performed by: INTERNAL MEDICINE

## 2021-07-20 PROCEDURE — 82024 ASSAY OF ACTH: CPT

## 2021-07-20 PROCEDURE — 99214 OFFICE O/P EST MOD 30 MIN: CPT | Performed by: INTERNAL MEDICINE

## 2021-07-20 PROCEDURE — G8417 CALC BMI ABV UP PARAM F/U: HCPCS | Performed by: INTERNAL MEDICINE

## 2021-07-20 PROCEDURE — 80048 BASIC METABOLIC PNL TOTAL CA: CPT

## 2021-07-20 PROCEDURE — G8427 DOCREV CUR MEDS BY ELIG CLIN: HCPCS | Performed by: INTERNAL MEDICINE

## 2021-07-20 PROCEDURE — 84443 ASSAY THYROID STIM HORMONE: CPT

## 2021-07-20 PROCEDURE — 36415 COLL VENOUS BLD VENIPUNCTURE: CPT

## 2021-07-20 PROCEDURE — 84402 ASSAY OF FREE TESTOSTERONE: CPT

## 2021-07-20 PROCEDURE — 84403 ASSAY OF TOTAL TESTOSTERONE: CPT

## 2021-07-20 PROCEDURE — 84270 ASSAY OF SEX HORMONE GLOBUL: CPT

## 2021-07-20 PROCEDURE — 82533 TOTAL CORTISOL: CPT

## 2021-07-20 PROCEDURE — 1036F TOBACCO NON-USER: CPT | Performed by: INTERNAL MEDICINE

## 2021-07-20 RX ORDER — LEVOTHYROXINE SODIUM 0.12 MG/1
125 TABLET ORAL DAILY
Qty: 30 TABLET | Refills: 5 | Status: SHIPPED | OUTPATIENT
Start: 2021-07-20

## 2021-07-20 RX ORDER — BROMOCRIPTINE MESYLATE 2.5 MG/1
2.5 TABLET ORAL DAILY
Qty: 30 TABLET | Refills: 3 | Status: SHIPPED | OUTPATIENT
Start: 2021-07-20

## 2021-07-20 RX ORDER — TESTOSTERONE GEL, 1% 10 MG/G
50 GEL TRANSDERMAL DAILY
Qty: 1 TUBE | Refills: 5 | Status: SHIPPED | OUTPATIENT
Start: 2021-07-20 | End: 2021-08-19

## 2021-07-20 RX ORDER — HYDROCORTISONE 10 MG/1
10 TABLET ORAL 2 TIMES DAILY
Qty: 60 TABLET | Refills: 3 | Status: SHIPPED | OUTPATIENT
Start: 2021-07-20

## 2021-07-20 NOTE — PROGRESS NOTES
[facility] PROFESSIONAL SERVS  PHYSICIANS [facility]. [facility] [address]  [facility], [address]  Dept: [phone]  Dept Fax: [phone]      Chief Complaint   Patient presents with    1 Year Follow Up     low testosterone        Patient presents for evaluation of hyperprolactinemia. I saw him 15 months ago  T he left without being seen 6 months ago   he has a history of 2 pituitary surgeries. The first was in 1978 with a skull flap done. Recurrent surgery in 1987, via transsphenoidal approach. The patient had recurrent headaches, back in 87. He had both surgeries at [place]. He was referred here  for elevated prolactin levels   He says he is not on any thyroid hormone or cortisone  I put him on both  He said he ran out of pills 4 to 5 months ago   he was on bromocriptine since he was first diagnosed. At that time he had galactorrhea as well     Past Medical History:   Diagnosis Date    Elevated prolactin level     Pituitary adenoma (HCC)     Sleep apnea        Current Outpatient Medications   Medication Sig Dispense Refill    hydrocortisone (CORTEF) 10 MG tablet Take 1 tablet by mouth 2 times daily 60 tablet 3    levothyroxine (SYNTHROID) 125 MCG tablet Take 1 tablet by mouth daily 30 tablet 5    bromocriptine (PARLODEL) 2.5 MG tablet Take 1 tablet by mouth daily 30 tablet 3    testosterone (ANDROGEL) 50 MG/5GM (1%) GEL 1% gel Apply 0.05 g topically daily for 30 days. 1 Tube 5    sertraline (ZOLOFT) 50 MG tablet Take 50 mg by mouth daily       No current facility-administered medications for this visit.      Review of Systems - General ROS: Fatigue, no energy, has lost 20 pounds  Psychological ROS: negative  Hematological and Lymphatic ROS: negative  Respiratory ROS: no cough, shortness of breath, or wheezing  Cardiovascular ROS: no chest pain or dyspnea on exertion  Gastrointestinal ROS: no abdominal pain, change in bowel habits, or black or bloody stools  Genito-Urinary ROS: no dysuria, trouble voiding, or hematuria  Musculoskeletal ROS: negative  Neurological ROS: no TIA or stroke symptoms  Dermatological ROS: negative     Blood pressure 107/82, pulse 83, temperature 97.8 °F (36.6 °C), height 6' 1\" (1.854 m), weight (!) 318 lb (144.2 kg). Physical Examination: General appearance - alert, well appearing, and in no distress  HEENT-head normocephalic, atraumatic  Mental status - alert, oriented to person, place, and time  Neck - supple, no significant adenopathy  Chest - clear to auscultation, no wheezes, rales or rhonchi, symmetric air entry  Heart - normal rate, regular rhythm, normal S1, S2, no murmurs, rubs, clicks or gallops  Abdomen - soft, nontender, nondistended, no masses or organomegaly  Neurological - alert, oriented, normal speech, no focal findings or movement disorder noted  Musculoskeletal - no joint tenderness, deformity or swelling  Extremities - peripheral pulses normal, no pedal edema, no clubbing or cyanosis  Skin - normal coloration and turgor, no rashes, no suspicious skin lesions noted              Diagnosis          Diagnosis Orders   1. Panhypopituitarism (HCC)  hydrocortisone (CORTEF) 10 MG tablet    bromocriptine (PARLODEL) 2.5 MG tablet    testosterone (ANDROGEL) 50 MG/5GM (1%) GEL 1% gel    ACTH    TSH With Reflex Ft4    Prolactin    Cortisol    Basic Metabolic Panel    Testosterone, free, total   2. Low testosterone  hydrocortisone (CORTEF) 10 MG tablet    bromocriptine (PARLODEL) 2.5 MG tablet    testosterone (ANDROGEL) 50 MG/5GM (1%) GEL 1% gel   3. Prolactinoma ([facility] [address]     4.  History of pituitary surgery         Orders Placed This Encounter   Procedures    ACTH     Standing Status:   Future     Number of Occurrences:   1     Standing Expiration Date:   7/20/2022    TSH With Reflex Ft4     Standing Status:   Future     Number of Occurrences:   1     Standing Expiration Date:   7/20/2022    Prolactin     Standing Status:   Future     Number of Occurrences:   1

## 2021-07-23 LAB
ACTH: 37.4 PG/ML (ref 7.2–63.3)
TESTOSTERONE FREE: NORMAL

## 2021-07-27 ENCOUNTER — TELEPHONE (OUTPATIENT)
Dept: INTERNAL MEDICINE CLINIC | Age: 61
End: 2021-07-27

## 2021-07-27 NOTE — TELEPHONE ENCOUNTER
VO per Dr. Tiffanie Martinez to make patient appt on Wednesday 7/28/21 or Thursday 7/29/21 about lab results. LPN called patient- left VM asking him to call office back about scheduling appt d/t lab results.

## 2021-07-29 ENCOUNTER — OFFICE VISIT (OUTPATIENT)
Dept: INTERNAL MEDICINE CLINIC | Age: 61
End: 2021-07-29
Payer: MEDICAID

## 2021-07-29 VITALS
DIASTOLIC BLOOD PRESSURE: 94 MMHG | BODY MASS INDEX: 41.75 KG/M2 | SYSTOLIC BLOOD PRESSURE: 153 MMHG | TEMPERATURE: 97 F | HEART RATE: 92 BPM | WEIGHT: 315 LBS | HEIGHT: 73 IN

## 2021-07-29 DIAGNOSIS — Z98.890 HISTORY OF PITUITARY SURGERY: ICD-10-CM

## 2021-07-29 DIAGNOSIS — E23.0 PANHYPOPITUITARISM (HCC): Primary | ICD-10-CM

## 2021-07-29 DIAGNOSIS — D35.2 PROLACTINOMA (HCC): ICD-10-CM

## 2021-07-29 PROCEDURE — G8417 CALC BMI ABV UP PARAM F/U: HCPCS | Performed by: INTERNAL MEDICINE

## 2021-07-29 PROCEDURE — 1036F TOBACCO NON-USER: CPT | Performed by: INTERNAL MEDICINE

## 2021-07-29 PROCEDURE — 99213 OFFICE O/P EST LOW 20 MIN: CPT | Performed by: INTERNAL MEDICINE

## 2021-07-29 PROCEDURE — 3017F COLORECTAL CA SCREEN DOC REV: CPT | Performed by: INTERNAL MEDICINE

## 2021-07-29 PROCEDURE — G8427 DOCREV CUR MEDS BY ELIG CLIN: HCPCS | Performed by: INTERNAL MEDICINE

## 2021-07-31 NOTE — PROGRESS NOTES
time       Diagnosis          Diagnosis Orders   1. Panhypopituitarism (HCC)  Prolactin    Cortisol    TSH With Reflex Ft4    ACTH    Testosterone, free, total   2. History of pituitary surgery     3.  Prolactinoma ([address]         Orders Placed This Encounter   Procedures    Prolactin     bnv     Standing Status:   Future     Standing Expiration Date:   7/29/2022    Cortisol     bnv     Standing Status:   Future     Standing Expiration Date:   7/29/2022     Order Specific Question:   8AM or 4PM?     Answer:   8am    TSH With Reflex Ft4     bnv     Standing Status:   Future     Standing Expiration Date:   7/29/2022    ACTH     bnv     Standing Status:   Future     Standing Expiration Date:   7/29/2022    Testosterone, free, total     bnv     Standing Status:   Future     Standing Expiration Date:   7/29/2022     TSH, ACTH are normal  cortisol is 5.14  prolactin elevated at 87.4  testosterone less than 3  sodium potassium normal  although TSH cortisol ACTH are normal I think if he gets to a stressful situation he would be in trouble prone to an addisonian crisis  I have therefore restarted his medications including Synthroid Cortef bromocriptine and testosterone  follow-up  3 months with labs  I warned him not to stop his medications he could get into severe difficulty